# Patient Record
Sex: FEMALE | Race: OTHER | HISPANIC OR LATINO
[De-identification: names, ages, dates, MRNs, and addresses within clinical notes are randomized per-mention and may not be internally consistent; named-entity substitution may affect disease eponyms.]

---

## 2022-08-24 PROBLEM — Z00.00 ENCOUNTER FOR PREVENTIVE HEALTH EXAMINATION: Status: ACTIVE | Noted: 2022-08-24

## 2022-08-25 ENCOUNTER — FORM ENCOUNTER (OUTPATIENT)
Age: 22
End: 2022-08-25

## 2022-08-25 ENCOUNTER — APPOINTMENT (OUTPATIENT)
Dept: ORTHOPEDIC SURGERY | Facility: CLINIC | Age: 22
End: 2022-08-25

## 2022-08-25 VITALS — HEIGHT: 63 IN | BODY MASS INDEX: 30.12 KG/M2 | WEIGHT: 170 LBS

## 2022-08-25 DIAGNOSIS — Z78.9 OTHER SPECIFIED HEALTH STATUS: ICD-10-CM

## 2022-08-25 DIAGNOSIS — M23.91 UNSPECIFIED INTERNAL DERANGEMENT OF RIGHT KNEE: ICD-10-CM

## 2022-08-25 PROCEDURE — 99204 OFFICE O/P NEW MOD 45 MIN: CPT

## 2022-08-25 PROCEDURE — 73564 X-RAY EXAM KNEE 4 OR MORE: CPT | Mod: RT

## 2022-08-25 RX ORDER — MELOXICAM 15 MG/1
15 TABLET ORAL
Qty: 30 | Refills: 1 | Status: ACTIVE | COMMUNITY
Start: 2022-08-25 | End: 1900-01-01

## 2022-08-25 NOTE — ASSESSMENT
[FreeTextEntry1] : XR without acute abnormalities \par Recommend MRI R Knee to eval bucket handle tear\par Mobic RX\par Denies calf pain

## 2022-08-25 NOTE — HISTORY OF PRESENT ILLNESS
[Sudden] : sudden [8] : 8 [0] : 0 [Dull/Aching] : dull/aching [Localized] : localized [Tightness] : tightness [Intermittent] : intermittent [Leisure] : leisure [Sleep] : sleep [Meds] : meds [Ice] : ice [Standing] : standing [Walking] : walking [Exercising] : exercising [Stairs] : stairs [de-identified] : 22 F here with right knee pain. She reports going to sit down, she felt a pop inher knee. She has swelling. She went to [facility], placed in a knee immobilizer. She is unable to bend the knee.  [] : no [FreeTextEntry1] : right knee  [FreeTextEntry3] : 8/18/22 [FreeTextEntry5] : New patient is here for right knee. 7 days ago Patient felt her knee pop out when she went to sit down. It started swelling and she was unable to bend her knee. The next day she went to ER for her knee xrays where they found no fracture in her right knee. Pain only on left side of knee [FreeTextEntry9] : cast [de-identified] : 8/19/22 [de-identified] : W [de-identified] : xray

## 2022-08-25 NOTE — IMAGING
[Right] : right knee [AP] : anteroposterior [Lateral] : lateral [[place]] : skyline [There are no fractures, subluxations or dislocations. No significant abnormalities are seen] : There are no fractures, subluxations or dislocations. No significant abnormalities are seen

## 2022-08-26 ENCOUNTER — TRANSCRIPTION ENCOUNTER (OUTPATIENT)
Age: 22
End: 2022-08-26

## 2022-08-26 ENCOUNTER — APPOINTMENT (OUTPATIENT)
Dept: MRI IMAGING | Facility: CLINIC | Age: 22
End: 2022-08-26

## 2022-08-26 PROCEDURE — 73721 MRI JNT OF LWR EXTRE W/O DYE: CPT | Mod: RT

## 2022-09-06 ENCOUNTER — APPOINTMENT (OUTPATIENT)
Dept: ORTHOPEDIC SURGERY | Facility: CLINIC | Age: 22
End: 2022-09-06

## 2022-09-06 VITALS — BODY MASS INDEX: 30.12 KG/M2 | WEIGHT: 170 LBS | HEIGHT: 63 IN

## 2022-09-06 DIAGNOSIS — R93.7 ABNORMAL FINDINGS ON DIAGNOSTIC IMAGING OF OTHER PARTS OF MUSCULOSKELETAL SYSTEM: ICD-10-CM

## 2022-09-06 PROCEDURE — 99213 OFFICE O/P EST LOW 20 MIN: CPT

## 2022-09-06 NOTE — DATA REVIEWED
[MRI] : MRI [Right] : of the right [Report was reviewed and noted in the chart] : The report was reviewed and noted in the chart [I reviewed the films/CD and agree] : I reviewed the films/CD and agree [FreeTextEntry1] : \par Impression: R knee\par 1. No meniscal or ligament injury.\par 2. Moderate bone contusion of the anterior/lateral femoral condyle.\par 3. Large joint effusion. Moderate soft tissue edema along the posterior margin of the femur.\par 4. Strain of the distal vastus lateralis muscle. Moderate strain of the proximal gastrocnemius musculature.\par Dictated by Basilio Em M.D. on 08/27/2022\par Report reviewed and signed by Basilio Em M.D. on 08/28/2022 08:33:51 AM

## 2022-09-06 NOTE — ASSESSMENT
[FreeTextEntry1] : XR without acute abnormalities \par Recommend MRI R Knee to eval bucket handle tear\par Mobic RX\par Denies calf pain\par \par 9/6/22 09/06/2022: MRI reviewed and discussed.\par Questions addressed with parent present\par RTO pRN.

## 2022-09-06 NOTE — PHYSICAL EXAM
[Right] : right knee [Orientated] : orientated [Able to Communicate] : able to communicate [Normal Skin] : normal skin [NL (140)] : flexion 140 degrees [NL (0)] : extension 0 degrees [] : no medial facet of patella tenderness [TWNoteComboBox7] : flexion 30 degrees [de-identified] : extension 0 degrees

## 2022-09-06 NOTE — IMAGING
[Right] : right knee [AP] : anteroposterior [Lateral] : lateral [[place]] : skyline [There are no fractures, subluxations or dislocations. No significant abnormalities are seen] : There are no fractures, subluxations or dislocations. No significant abnormalities are seen

## 2022-09-06 NOTE — HISTORY OF PRESENT ILLNESS
[Sudden] : sudden [5] : 5 [0] : 0 [Dull/Aching] : dull/aching [Localized] : localized [Tightness] : tightness [Intermittent] : intermittent [Leisure] : leisure [Sleep] : sleep [Meds] : meds [Ice] : ice [Standing] : standing [Walking] : walking [Exercising] : exercising [Stairs] : stairs [de-identified] : \par 9/6/22: MRI review right knee.  Here with parent. Feeling much better\par \par 22 F here with right knee pain. She reports going to sit down, she felt a pop inher knee. She has swelling. She went to [facility], placed in a knee immobilizer. She is unable to bend the knee.  [] : no [FreeTextEntry1] : right knee  [FreeTextEntry3] : 8/18/22 [FreeTextEntry5] : New patient is here for right knee. 7 days ago Patient felt her knee pop out when she went to sit down. It started swelling and she was unable to bend her knee. The next day she went to ER for her knee xrays where they found no fracture in her right knee. Pain only on left side of knee [FreeTextEntry9] : cast [de-identified] : 8/19/22 [de-identified] : xray [de-identified] : Mobic

## 2022-09-06 NOTE — DISCUSSION/SUMMARY
[de-identified] : The documentation recorded by the scribe accurately reflects the service I personally performed and the decisions made by me.\par I, Cornelio Pepper, attest that this documentation has been prepared under the direction and in the presence of Provider Damon Hernández MD.\par \par The patient was seen by Damon Hernández MD\par